# Patient Record
(demographics unavailable — no encounter records)

---

## 2025-07-29 NOTE — DATA REVIEWED
[FreeTextEntry1] :  25 CTA Chest revealed Heart size is normal. No pericardial effusion. Calcified plaque in proximal LAD causing minimal stenosis. Status post aortic valve replacement. Left aortic arch with three vessel branching pattern of the great vessels without stenosis. No dissection or intramural hematoma. Maximum aorta luminal measurements: * Root: 4.9 cm, unchanged using the same measurement technique and site of the measurement. * Ascendin.4 cm, unchanged * Arch: 3.4 cm, unchanged * Descending: 3 cm, unchanged.  24 CTA Chest revealed Status post bioprosthetic aortic valve replacement. No intramural hematoma or dissection. Aortic branch vessels: Patent with a conventional branch pattern. Aortic measurements are unchanged since 2023. The aorta measures (all measurements are in centimeters): *  Ascending thoracic aorta at the main pulmonary artery:  4.1 x 4.1. *  Aortic arch: 3.0 x 3.0. *  At the aortic isthmus:  2.9 x 2.9. *  Descending thoracic aorta at the main pulmonary artery:  2.7 x 2.7. *  Descending thoracic aorta at the diaphragmatic hiatus:  2.6 x 2.6. Patent trachea and mainstem bronchi. Emphysema. Stable few sub centimeter pulmonary nodules including a 0.5 cm subpleural right lower lobe nodule (series 5, image 254) and a 3 mm left lower lobe nodule (series 5, image 392). No focal consolidation. No evidence of interstitial lung disease.  No pleural effusion. Upper Abdomen: Stable 1.8 cm right hepatic lobe hypodensity.

## 2025-07-29 NOTE — REASON FOR VISIT
[Follow-Up: _____] : a [unfilled] follow-up visit [Home] : at home, [unfilled] , at the time of the visit. [Medical Office: ([facility])___] : at the medical office located in  [Telephone (audio)] : This telephonic visit was provided via audio only technology. [Patient preference] : patient preference. [Verbal consent obtained from patient] : the patient, [unfilled]

## 2025-07-29 NOTE — HISTORY OF PRESENT ILLNESS
[FreeTextEntry1] : This is a 70 year old male with significant past medical history of HTN, known dilated aortic root, Aortic regurgitation, Hx of  Aortic valve Replacement using 25 mm [employer] prosthetic aortic valve on 20 with Dr. Ant Spicer.   Patient is doing well and denies recent hospitalization, ER visits, or surgeries. He denies fever, chills, fatigue, headache, blurred vision, dizziness, syncope, chest pain, palpitations, shortness of breath, orthopnea, paroxysmal nocturnal dyspnea, nausea, vomiting, abdominal pain, back pain, headache, visual disturbances, CVA, PE, DVT, D/C, hematochezia, melena, dysuria, hematuria,  BRBPR or swelling to legs.     25 CTA Chest revealed Heart size is normal. No pericardial effusion. Calcified plaque in proximal LAD causing minimal stenosis. Status post aortic valve replacement. Left aortic arch with three vessel branching pattern of the great vessels without stenosis. No dissection or intramural hematoma. Maximum aorta luminal measurements: * Root: 4.9 cm, unchanged using the same measurement technique and site of the measurement. * Ascendin.4 cm, unchanged * Arch: 3.4 cm, unchanged * Descending: 3 cm, unchanged.

## 2025-07-29 NOTE — ASSESSMENT
[FreeTextEntry1] :  This is a 70 year old male with significant past medical history of HTN, known dilated aortic root, Aortic regurgitation, Hx of  Aortic valve Replacement using 25 mm [employer] prosthetic aortic valve on 20 with Dr. Ant Spicer.   Patient is doing well and denies recent hospitalization, ER visits, or surgeries. He denies fever, chills, fatigue, headache, blurred vision, dizziness, syncope, chest pain, palpitations, shortness of breath, orthopnea, paroxysmal nocturnal dyspnea, nausea, vomiting, abdominal pain, back pain, headache, visual disturbances, CVA, PE, DVT, D/C, hematochezia, melena, dysuria, hematuria,  BRBPR or swelling to legs.    25 CTA Chest revealed Heart size is normal. No pericardial effusion. Calcified plaque in proximal LAD causing minimal stenosis. Status post aortic valve replacement. Left aortic arch with three vessel branching pattern of the great vessels without stenosis. No dissection or intramural hematoma. Maximum aorta luminal measurements: * Root: 4.9 cm, unchanged using the same measurement technique and site of the measurement. * Ascendin.4 cm, unchanged * Arch: 3.4 cm, unchanged * Descending: 3 cm, unchanged.  I have reviewed the patient's medical records, diagnostic images during the time of this office consultation and have made the following recommendation. The report was reviewed and noted in the chart. The surgical repair is intact and stable.       Plan   1. [facility] with [facility] Chest  2. Continue medication regimen. 3. Follow up with cardiologist and PCP. 4. BP control- I have recommended the patient to monitor his blood pressure closely. I have also advised the patient to take daily blood pressures at home and adhere to medication regimen. 5. Discussed signs and symptoms that warrant emergency medical attention 6. Follow up with pulmonologist for pulmonary nodules 7. Follow up with pcp regarding thyroid nodule